# Patient Record
Sex: MALE | Race: OTHER | URBAN - METROPOLITAN AREA
[De-identification: names, ages, dates, MRNs, and addresses within clinical notes are randomized per-mention and may not be internally consistent; named-entity substitution may affect disease eponyms.]

---

## 2017-07-20 ENCOUNTER — EMERGENCY (EMERGENCY)
Facility: HOSPITAL | Age: 42
LOS: 0 days | Discharge: HOME | End: 2017-07-20
Attending: EMERGENCY MEDICINE | Admitting: EMERGENCY MEDICINE
Payer: SUBSIDIZED

## 2017-07-20 VITALS
RESPIRATION RATE: 18 BRPM | DIASTOLIC BLOOD PRESSURE: 102 MMHG | WEIGHT: 184.09 LBS | OXYGEN SATURATION: 98 % | SYSTOLIC BLOOD PRESSURE: 163 MMHG | TEMPERATURE: 99 F | HEART RATE: 103 BPM | HEIGHT: 68 IN

## 2017-07-20 VITALS
RESPIRATION RATE: 17 BRPM | SYSTOLIC BLOOD PRESSURE: 150 MMHG | TEMPERATURE: 99 F | HEART RATE: 82 BPM | OXYGEN SATURATION: 99 % | DIASTOLIC BLOOD PRESSURE: 94 MMHG

## 2017-07-20 PROCEDURE — 99285 EMERGENCY DEPT VISIT HI MDM: CPT

## 2017-07-20 PROCEDURE — 70486 CT MAXILLOFACIAL W/O DYE: CPT | Mod: 26

## 2017-07-20 PROCEDURE — 76377 3D RENDER W/INTRP POSTPROCES: CPT | Mod: 26

## 2017-07-20 RX ORDER — OXYCODONE HYDROCHLORIDE 5 MG/1
5 TABLET ORAL ONCE
Qty: 0 | Refills: 0 | Status: DISCONTINUED | OUTPATIENT
Start: 2017-07-20 | End: 2017-07-20

## 2017-07-20 RX ORDER — CEPHALEXIN 500 MG
1 CAPSULE ORAL
Qty: 10 | Refills: 0 | OUTPATIENT
Start: 2017-07-20 | End: 2017-07-25

## 2017-07-20 RX ADMIN — OXYCODONE HYDROCHLORIDE 5 MILLIGRAM(S): 5 TABLET ORAL at 12:28

## 2017-07-20 NOTE — ED ADULT NURSE NOTE - OBJECTIVE STATEMENT
Pt is a 41y male, A & O x 3, VSS, presents to ED s/p fall from 2 s-3 steps, pt was carrying boxes and tripped, hit head on step, witnessed, pt denies LOV, PERRL, pt has bleeding bilateral nostrils, controlled, pt in no apparent distress, will contnue to monitor. Denies blood thinners

## 2017-07-20 NOTE — ED STATDOCS - ATTENDING CONTRIBUTION TO CARE
I performed the initial face to face bedside interview with this patient regarding history of present illness, review of symptoms and past medical, social and family history.  I completed an independent physical examination.  I was the initial provider who evaluated this patient.  The history, review of symptoms and examination was documented by the NP in my presence and I attest to the accuracy of the documentation.  I have signed out the follow up of any pending tests (i.e. labs, radiological studies) to the NP.  I have discussed the patient’s plan of care and disposition with the NP.

## 2017-07-20 NOTE — ED STATDOCS - PROGRESS NOTE DETAILS
41 yr. old male PMH: presents to ED with pain and swelling of nose and right shoulder pain after fall while carrying a box this am approximately 1 hr. ago. No LOC . No weakness. No dizziness or hradache. Seen and examined by attending in Intake. Plan: IV, Labs and CT. Will F/U with results and re evaluate. Jered QUEZADA 41 yr. old male PMH: presents to ED with pain and swelling of nose and right shoulder pain after fall while carrying a box this am approximately 1 hr. ago. No LOC . No weakness. No dizziness or headache. Seen and examined by attending in Intake. Plan: IV, Labs and CT. Will F/U with results and re evaluate. MTangredi NP  PE: HEENT PERRLA EOMS intact, Face tender swollen deformity of nose with bloody drainage from right nostril, tender left TMJ , no ecchymosis or abrasions, Neck: non tender c-spine ROM: flexion and lateral rotation normal, Chest: non tender Lungs clear, Abd: non tender Hip and Pelvis non tender , Right Shoulder: mild tender ROM; normal, rad. pulse + No neurovascular deficit. MTangredi NP Results of CT Facial bones returned Dr. Barrow called for consult. Jered QUEZADA 41 yr. old male PMH: presents to ED with pain and swelling of nose and right shoulder pain after fall while carrying a box this am approximately 1 hr. ago. No LOC . No weakness. No dizziness or headache. Seen and examined by attending in Intake. Plan: IV, Labs and CT. Will F/U with results and re evaluate. MTangredi NP  PE: HEENT PERRLA EOMS intact, Face tender swollen deformity of nose with bloody drainage from right nostril,no submucosal hematoma, tender left TMJ , no ecchymosis or abrasions, Neck: non tender c-spine ROM: flexion and lateral rotation normal, Chest: non tender Lungs clear, Abd: non tender Hip and Pelvis non tender , Right Shoulder: mild tender ROM; normal, rad. pulse + No neurovascular deficit. MTangredi NP Dr. Barrow returened call reported results of CT and instructed to have patient F/U in office in 1 week. Jered QUEZADA

## 2017-07-20 NOTE — ED ADULT TRIAGE NOTE - CHIEF COMPLAINT QUOTE
trip and fall carrying boxes, struck nose, no loc no dizziness or headache trip and fall carrying boxes, struck nose, no lo, no blood thinners, no dizziness or headache

## 2017-07-20 NOTE — ED STATDOCS - MEDICAL DECISION MAKING DETAILS
Pt with nasal bone fx.  Will have f/u with ENT, ice area, no nose blowing, pain meds.  Return precautions given.

## 2017-07-20 NOTE — ED STATDOCS - SKIN TURGOR, MLM
swelling and tenderness to the bilateral nasal bones. sub crepitus no active bleeding from nose. no septal hematoma. no other facial trauma.

## 2017-07-20 NOTE — ED STATDOCS - PROGRESS NOTE ADDITIONAL1
At [facility], we strive to deliver an exceptional experience to you, every time we see you.    If you receive a survey in the mail, please send us back your thoughts. We really do value your feedback.    Thank you for visiting [facility]    Normal or non-critical lab and imaging results will be communicated to you by MyChart, letter or phone within 7 days.  If you do not hear from us within 10 days, please call the clinic. If you have a critical or abnormal lab result, we will notify you by phone as soon as possible.     If you have any questions regarding your visit please contact:     [facility]  Clinic Hours Telephone Number   Dr. Murtaza Maciel   7am-7pm  Monday through Thursday  7am-5pm Friday [phone]  Dona CARRILLO RN   Pharmacy 8:30am-9pm Monday-Friday    9am-5pm Saturday-Sunday [phone]   Urgent Care 11am-9pm Monday-Friday        9am-5pm Saturday-Sunday [phone]     After hours, weekend or if you need to make an appointment with your primary provider please call [phone].   After Hours nurse advise: call [place] [facility]: [phone]    Use [employer] (secure email communication and access to your chart) to send your primary care provider a message or make an appointment. Ask someone on your Team how to sign up for [employer]. To log on to [employer] or for more information in [employer] please visit the website at [email or web address].   As of October 8, 2013, all password changes, disabled accounts, or ID changes in [employer]MyHealth will be done by our Access Services Department.   If you need help with your account or password, call: [phone]. Clinic staff no longer has the ability to change passwords.     
Additional Progress Note...

## 2017-07-20 NOTE — ED STATDOCS - OBJECTIVE STATEMENT
42 y/o male presents to ED due to a fall one hour ago. Pt was carrying a box when he tripped and fell, landing on his face. C/o nose pain, right shoulder pain.

## 2017-07-20 NOTE — ED ADULT NURSE NOTE - CAS EDN DISCHARGE ASSESSMENT
No adverse reaction to first time med in ED/Symptoms improved/Alert and oriented to person, place and time

## 2017-07-21 DIAGNOSIS — S02.2XXA FRACTURE OF NASAL BONES, INITIAL ENCOUNTER FOR CLOSED FRACTURE: ICD-10-CM
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
## 2018-04-19 PROBLEM — Z00.00 ENCOUNTER FOR PREVENTIVE HEALTH EXAMINATION: Status: ACTIVE | Noted: 2018-04-19

## 2018-06-04 ENCOUNTER — APPOINTMENT (OUTPATIENT)
Dept: UROLOGY | Facility: HOSPITAL | Age: 43
End: 2018-06-04

## 2018-06-04 ENCOUNTER — OUTPATIENT (OUTPATIENT)
Dept: OUTPATIENT SERVICES | Facility: HOSPITAL | Age: 43
LOS: 1 days | End: 2018-06-04
Payer: SELF-PAY

## 2018-06-04 VITALS — DIASTOLIC BLOOD PRESSURE: 90 MMHG | SYSTOLIC BLOOD PRESSURE: 147 MMHG | HEART RATE: 79 BPM

## 2018-06-04 VITALS — BODY MASS INDEX: 28.93 KG/M2 | WEIGHT: 180 LBS | HEIGHT: 66 IN

## 2018-06-04 DIAGNOSIS — N40.1 BENIGN PROSTATIC HYPERPLASIA WITH LOWER URINARY TRACT SYMPMS: ICD-10-CM

## 2018-06-04 DIAGNOSIS — N13.8 BENIGN PROSTATIC HYPERPLASIA WITH LOWER URINARY TRACT SYMPMS: ICD-10-CM

## 2018-06-04 DIAGNOSIS — N40.1 BENIGN PROSTATIC HYPERPLASIA WITH LOWER URINARY TRACT SYMPTOMS: ICD-10-CM

## 2018-06-04 DIAGNOSIS — R30.0 DYSURIA: ICD-10-CM

## 2018-06-04 LAB
APPEARANCE UR: CLEAR — SIGNIFICANT CHANGE UP
BACTERIA # UR AUTO: NEGATIVE — SIGNIFICANT CHANGE UP
BILIRUB UR-MCNC: NEGATIVE — SIGNIFICANT CHANGE UP
COLOR SPEC: YELLOW — SIGNIFICANT CHANGE UP
DIFF PNL FLD: NEGATIVE — SIGNIFICANT CHANGE UP
EPI CELLS # UR: 0 /HPF — SIGNIFICANT CHANGE UP (ref 0–5)
GLUCOSE UR QL: NEGATIVE MG/DL — SIGNIFICANT CHANGE UP
KETONES UR-MCNC: NEGATIVE — SIGNIFICANT CHANGE UP
LEUKOCYTE ESTERASE UR-ACNC: NEGATIVE — SIGNIFICANT CHANGE UP
NITRITE UR-MCNC: NEGATIVE — SIGNIFICANT CHANGE UP
PH UR: 6 — SIGNIFICANT CHANGE UP (ref 5–8)
PROT UR-MCNC: NEGATIVE MG/DL — SIGNIFICANT CHANGE UP
PSA FREE FLD-MCNC: 0.27 NG/ML — SIGNIFICANT CHANGE UP
PSA FREE FLD-MCNC: 62.8 — SIGNIFICANT CHANGE UP
PSA FREE MFR FLD: 0.43 NG/ML — SIGNIFICANT CHANGE UP (ref 0–4)
RBC CASTS # UR COMP ASSIST: 1 /HPF — SIGNIFICANT CHANGE UP (ref 0–4)
SP GR SPEC: 1.02 — SIGNIFICANT CHANGE UP (ref 1.01–1.02)
UROBILINOGEN FLD QL: NEGATIVE MG/DL — SIGNIFICANT CHANGE UP
WBC UR QL: 1 /HPF — SIGNIFICANT CHANGE UP (ref 0–5)

## 2018-06-04 PROCEDURE — 51798 US URINE CAPACITY MEASURE: CPT

## 2018-06-04 PROCEDURE — 87086 URINE CULTURE/COLONY COUNT: CPT

## 2018-06-04 PROCEDURE — 81001 URINALYSIS AUTO W/SCOPE: CPT

## 2018-06-04 PROCEDURE — 84154 ASSAY OF PSA FREE: CPT

## 2018-06-04 PROCEDURE — 84153 ASSAY OF PSA TOTAL: CPT

## 2018-06-04 PROCEDURE — G0463: CPT

## 2018-06-04 RX ORDER — TAMSULOSIN HYDROCHLORIDE 0.4 MG/1
0.4 CAPSULE ORAL
Qty: 30 | Refills: 0 | Status: ACTIVE | COMMUNITY
Start: 2018-06-04 | End: 1900-01-01

## 2018-06-09 ENCOUNTER — APPOINTMENT (OUTPATIENT)
Dept: ULTRASOUND IMAGING | Facility: CLINIC | Age: 43
End: 2018-06-09

## 2018-07-02 ENCOUNTER — APPOINTMENT (OUTPATIENT)
Dept: UROLOGY | Facility: HOSPITAL | Age: 43
End: 2018-07-02

## 2023-11-24 ENCOUNTER — EMERGENCY (EMERGENCY)
Facility: HOSPITAL | Age: 48
LOS: 0 days | Discharge: HOME | End: 2023-11-24
Attending: STUDENT IN AN ORGANIZED HEALTH CARE EDUCATION/TRAINING PROGRAM
Payer: COMMERCIAL

## 2023-11-24 VITALS
HEART RATE: 85 BPM | SYSTOLIC BLOOD PRESSURE: 150 MMHG | RESPIRATION RATE: 18 BRPM | TEMPERATURE: 98 F | DIASTOLIC BLOOD PRESSURE: 96 MMHG | OXYGEN SATURATION: 96 %

## 2023-11-24 VITALS
OXYGEN SATURATION: 97 % | SYSTOLIC BLOOD PRESSURE: 163 MMHG | HEIGHT: 68 IN | TEMPERATURE: 98 F | RESPIRATION RATE: 15 BRPM | HEART RATE: 98 BPM | WEIGHT: 158.07 LBS | DIASTOLIC BLOOD PRESSURE: 96 MMHG
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
## 2023-11-24 DIAGNOSIS — R07.9 CHEST PAIN, UNSPECIFIED: ICD-10-CM

## 2023-11-24 DIAGNOSIS — R10.32 LEFT LOWER QUADRANT PAIN: ICD-10-CM

## 2023-11-24 DIAGNOSIS — M54.2 CERVICALGIA: ICD-10-CM
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
## 2023-11-24 DIAGNOSIS — M25.512 PAIN IN LEFT SHOULDER: ICD-10-CM
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
## 2023-11-24 LAB
ALBUMIN SERPL ELPH-MCNC: 3.9 G/DL — SIGNIFICANT CHANGE UP (ref 3.3–5)
ALBUMIN SERPL ELPH-MCNC: 3.9 G/DL — SIGNIFICANT CHANGE UP (ref 3.3–5)
ALP SERPL-CCNC: 175 U/L — HIGH (ref 40–120)
ALP SERPL-CCNC: 175 U/L — HIGH (ref 40–120)
ALT FLD-CCNC: 72 U/L — SIGNIFICANT CHANGE UP (ref 12–78)
ALT FLD-CCNC: 72 U/L — SIGNIFICANT CHANGE UP (ref 12–78)
ANION GAP SERPL CALC-SCNC: 5 MMOL/L — SIGNIFICANT CHANGE UP (ref 5–17)
ANION GAP SERPL CALC-SCNC: 5 MMOL/L — SIGNIFICANT CHANGE UP (ref 5–17)
APTT BLD: 28.1 SEC — SIGNIFICANT CHANGE UP (ref 24.5–35.6)
APTT BLD: 28.1 SEC — SIGNIFICANT CHANGE UP (ref 24.5–35.6)
AST SERPL-CCNC: 33 U/L — SIGNIFICANT CHANGE UP (ref 15–37)
AST SERPL-CCNC: 33 U/L — SIGNIFICANT CHANGE UP (ref 15–37)
BASOPHILS # BLD AUTO: 0.04 K/UL — SIGNIFICANT CHANGE UP (ref 0–0.2)
BASOPHILS # BLD AUTO: 0.04 K/UL — SIGNIFICANT CHANGE UP (ref 0–0.2)
BASOPHILS NFR BLD AUTO: 0.6 % — SIGNIFICANT CHANGE UP (ref 0–2)
BASOPHILS NFR BLD AUTO: 0.6 % — SIGNIFICANT CHANGE UP (ref 0–2)
BILIRUB SERPL-MCNC: 0.7 MG/DL — SIGNIFICANT CHANGE UP (ref 0.2–1.2)
BILIRUB SERPL-MCNC: 0.7 MG/DL — SIGNIFICANT CHANGE UP (ref 0.2–1.2)
BUN SERPL-MCNC: 10 MG/DL — SIGNIFICANT CHANGE UP (ref 7–23)
BUN SERPL-MCNC: 10 MG/DL — SIGNIFICANT CHANGE UP (ref 7–23)
CALCIUM SERPL-MCNC: 9.2 MG/DL — SIGNIFICANT CHANGE UP (ref 8.5–10.1)
CALCIUM SERPL-MCNC: 9.2 MG/DL — SIGNIFICANT CHANGE UP (ref 8.5–10.1)
CHLORIDE SERPL-SCNC: 101 MMOL/L — SIGNIFICANT CHANGE UP (ref 96–108)
CHLORIDE SERPL-SCNC: 101 MMOL/L — SIGNIFICANT CHANGE UP (ref 96–108)
CO2 SERPL-SCNC: 29 MMOL/L — SIGNIFICANT CHANGE UP (ref 22–31)
CO2 SERPL-SCNC: 29 MMOL/L — SIGNIFICANT CHANGE UP (ref 22–31)
CREAT SERPL-MCNC: 1.13 MG/DL — SIGNIFICANT CHANGE UP (ref 0.5–1.3)
CREAT SERPL-MCNC: 1.13 MG/DL — SIGNIFICANT CHANGE UP (ref 0.5–1.3)
EGFR: 81 ML/MIN/1.73M2 — SIGNIFICANT CHANGE UP
EGFR: 81 ML/MIN/1.73M2 — SIGNIFICANT CHANGE UP
EOSINOPHIL # BLD AUTO: 0.05 K/UL — SIGNIFICANT CHANGE UP (ref 0–0.5)
EOSINOPHIL # BLD AUTO: 0.05 K/UL — SIGNIFICANT CHANGE UP (ref 0–0.5)
EOSINOPHIL NFR BLD AUTO: 0.7 % — SIGNIFICANT CHANGE UP (ref 0–6)
EOSINOPHIL NFR BLD AUTO: 0.7 % — SIGNIFICANT CHANGE UP (ref 0–6)
GLUCOSE SERPL-MCNC: 305 MG/DL — HIGH (ref 70–99)
GLUCOSE SERPL-MCNC: 305 MG/DL — HIGH (ref 70–99)
HCT VFR BLD CALC: 44.9 % — SIGNIFICANT CHANGE UP (ref 39–50)
HCT VFR BLD CALC: 44.9 % — SIGNIFICANT CHANGE UP (ref 39–50)
HGB BLD-MCNC: 16.6 G/DL — SIGNIFICANT CHANGE UP (ref 13–17)
HGB BLD-MCNC: 16.6 G/DL — SIGNIFICANT CHANGE UP (ref 13–17)
IMM GRANULOCYTES NFR BLD AUTO: 0.8 % — SIGNIFICANT CHANGE UP (ref 0–0.9)
IMM GRANULOCYTES NFR BLD AUTO: 0.8 % — SIGNIFICANT CHANGE UP (ref 0–0.9)
INR BLD: 0.9 RATIO — SIGNIFICANT CHANGE UP (ref 0.85–1.18)
INR BLD: 0.9 RATIO — SIGNIFICANT CHANGE UP (ref 0.85–1.18)
LIDOCAIN IGE QN: 25 U/L — SIGNIFICANT CHANGE UP (ref 13–75)
LIDOCAIN IGE QN: 25 U/L — SIGNIFICANT CHANGE UP (ref 13–75)
LYMPHOCYTES # BLD AUTO: 1.18 K/UL — SIGNIFICANT CHANGE UP (ref 1–3.3)
LYMPHOCYTES # BLD AUTO: 1.18 K/UL — SIGNIFICANT CHANGE UP (ref 1–3.3)
LYMPHOCYTES # BLD AUTO: 16.5 % — SIGNIFICANT CHANGE UP (ref 13–44)
LYMPHOCYTES # BLD AUTO: 16.5 % — SIGNIFICANT CHANGE UP (ref 13–44)
MCHC RBC-ENTMCNC: 31.8 PG — SIGNIFICANT CHANGE UP (ref 27–34)
MCHC RBC-ENTMCNC: 31.8 PG — SIGNIFICANT CHANGE UP (ref 27–34)
MCHC RBC-ENTMCNC: 37 GM/DL — HIGH (ref 32–36)
MCHC RBC-ENTMCNC: 37 GM/DL — HIGH (ref 32–36)
MCV RBC AUTO: 86 FL — SIGNIFICANT CHANGE UP (ref 80–100)
MCV RBC AUTO: 86 FL — SIGNIFICANT CHANGE UP (ref 80–100)
MONOCYTES # BLD AUTO: 0.42 K/UL — SIGNIFICANT CHANGE UP (ref 0–0.9)
MONOCYTES # BLD AUTO: 0.42 K/UL — SIGNIFICANT CHANGE UP (ref 0–0.9)
MONOCYTES NFR BLD AUTO: 5.9 % — SIGNIFICANT CHANGE UP (ref 2–14)
MONOCYTES NFR BLD AUTO: 5.9 % — SIGNIFICANT CHANGE UP (ref 2–14)
NEUTROPHILS # BLD AUTO: 5.4 K/UL — SIGNIFICANT CHANGE UP (ref 1.8–7.4)
NEUTROPHILS # BLD AUTO: 5.4 K/UL — SIGNIFICANT CHANGE UP (ref 1.8–7.4)
NEUTROPHILS NFR BLD AUTO: 75.5 % — SIGNIFICANT CHANGE UP (ref 43–77)
NEUTROPHILS NFR BLD AUTO: 75.5 % — SIGNIFICANT CHANGE UP (ref 43–77)
PLATELET # BLD AUTO: 219 K/UL — SIGNIFICANT CHANGE UP (ref 150–400)
PLATELET # BLD AUTO: 219 K/UL — SIGNIFICANT CHANGE UP (ref 150–400)
POTASSIUM SERPL-MCNC: 4 MMOL/L — SIGNIFICANT CHANGE UP (ref 3.5–5.3)
POTASSIUM SERPL-MCNC: 4 MMOL/L — SIGNIFICANT CHANGE UP (ref 3.5–5.3)
POTASSIUM SERPL-SCNC: 4 MMOL/L — SIGNIFICANT CHANGE UP (ref 3.5–5.3)
POTASSIUM SERPL-SCNC: 4 MMOL/L — SIGNIFICANT CHANGE UP (ref 3.5–5.3)
PROT SERPL-MCNC: 8.4 GM/DL — HIGH (ref 6–8.3)
PROT SERPL-MCNC: 8.4 GM/DL — HIGH (ref 6–8.3)
PROTHROM AB SERPL-ACNC: 10.2 SEC — SIGNIFICANT CHANGE UP (ref 9.5–13)
PROTHROM AB SERPL-ACNC: 10.2 SEC — SIGNIFICANT CHANGE UP (ref 9.5–13)
RBC # BLD: 5.22 M/UL — SIGNIFICANT CHANGE UP (ref 4.2–5.8)
RBC # BLD: 5.22 M/UL — SIGNIFICANT CHANGE UP (ref 4.2–5.8)
RBC # FLD: 11.9 % — SIGNIFICANT CHANGE UP (ref 10.3–14.5)
RBC # FLD: 11.9 % — SIGNIFICANT CHANGE UP (ref 10.3–14.5)
SODIUM SERPL-SCNC: 135 MMOL/L — SIGNIFICANT CHANGE UP (ref 135–145)
SODIUM SERPL-SCNC: 135 MMOL/L — SIGNIFICANT CHANGE UP (ref 135–145)
WBC # BLD: 7.15 K/UL — SIGNIFICANT CHANGE UP (ref 3.8–10.5)
WBC # BLD: 7.15 K/UL — SIGNIFICANT CHANGE UP (ref 3.8–10.5)
WBC # FLD AUTO: 7.15 K/UL — SIGNIFICANT CHANGE UP (ref 3.8–10.5)
WBC # FLD AUTO: 7.15 K/UL — SIGNIFICANT CHANGE UP (ref 3.8–10.5)

## 2023-11-24 PROCEDURE — 93010 ELECTROCARDIOGRAM REPORT: CPT

## 2023-11-24 PROCEDURE — 85610 PROTHROMBIN TIME: CPT

## 2023-11-24 PROCEDURE — 99285 EMERGENCY DEPT VISIT HI MDM: CPT | Mod: 25

## 2023-11-24 PROCEDURE — 99285 EMERGENCY DEPT VISIT HI MDM: CPT

## 2023-11-24 PROCEDURE — 93005 ELECTROCARDIOGRAM TRACING: CPT

## 2023-11-24 PROCEDURE — 74177 CT ABD & PELVIS W/CONTRAST: CPT | Mod: MA

## 2023-11-24 PROCEDURE — 74177 CT ABD & PELVIS W/CONTRAST: CPT | Mod: 26,MA

## 2023-11-24 PROCEDURE — 70450 CT HEAD/BRAIN W/O DYE: CPT | Mod: 26,MA

## 2023-11-24 PROCEDURE — 72125 CT NECK SPINE W/O DYE: CPT | Mod: MA

## 2023-11-24 PROCEDURE — 85025 COMPLETE CBC W/AUTO DIFF WBC: CPT

## 2023-11-24 PROCEDURE — 36415 COLL VENOUS BLD VENIPUNCTURE: CPT

## 2023-11-24 PROCEDURE — 80053 COMPREHEN METABOLIC PANEL: CPT

## 2023-11-24 PROCEDURE — 71260 CT THORAX DX C+: CPT | Mod: MA

## 2023-11-24 PROCEDURE — 96374 THER/PROPH/DIAG INJ IV PUSH: CPT | Mod: XU

## 2023-11-24 PROCEDURE — 85730 THROMBOPLASTIN TIME PARTIAL: CPT

## 2023-11-24 PROCEDURE — 72125 CT NECK SPINE W/O DYE: CPT | Mod: 26,MA

## 2023-11-24 PROCEDURE — 71260 CT THORAX DX C+: CPT | Mod: 26,MA

## 2023-11-24 PROCEDURE — 70450 CT HEAD/BRAIN W/O DYE: CPT | Mod: MA

## 2023-11-24 PROCEDURE — 83690 ASSAY OF LIPASE: CPT

## 2023-11-24 RX ORDER — ACETAMINOPHEN 500 MG
1000 TABLET ORAL ONCE
Refills: 0 | Status: COMPLETED | OUTPATIENT
Start: 2023-11-24 | End: 2023-11-24

## 2023-11-24 RX ADMIN — Medication 400 MILLIGRAM(S): at 17:03

## 2023-11-24 NOTE — ED STATDOCS - NSFOLLOWUPINSTRUCTIONS_ED_ALL_ED_FT
Please follow-up with your doctor(s) within the next 3 days, but see medical sooner if your symptoms persist or worsen.  Please call tomorrow for an appointment.    You were given a copy of your labs and/or imaging.  Please go-over these with your doctor(s).     If you have any worsening of symptoms or any other concerns please see your doctor or return to the ED immediately.    Please continue taking your home medications as directed.  Do not use alcohol when taking any medication (especially antibiotics, tylenol or other pain medication) unless you check with the doctor or pharmacist.     Please use 650mg tylenol (or acetaminophen) every 6 hours and 600mg motrin (or advil or ibuprofen) every 6 hours as needed for pain/discomfort/swelling.  Make sure not to use more than 3500mg in any 24 hour period.       Accidente automovilístico    LO QUE NECESITA SABER:    Los accidentes automovilísticos pueden causar lesiones ocasionadas por el impacto o por april sido movido de un lado al otro dentro del greg. Podría tener un hematoma en el abdomen, pecho o helen debido al cinturón de seguridad. También puede que tenga dolor en beltran gaviota, helen o espalda. Podría sentir dolor en las rodillas, caderas o muslos si beltran cuerpo golpea el tablero o el volante. El dolor muscular tiende a empeorar de 1 a 2 días después del accidente.    INSTRUCCIONES SOBRE EL DINA HOSPITALARIA:    Llame al número de emergencias local (911 en [place]) si:    Usted tiene un nuevo dolor de pecho o éste empeora, o tiene falta de aliento.    Llame a beltran médico si:    Usted tiene un dolor nuevo o peor en el abdomen.    Usted tiene náuseas y vómitos que no mejoran.    Usted tiene un natalie dolor de gautam.    Usted tiene debilidad, hormigueo o adormecimiento en chapin brazos o piernas.    Usted tiene un dolor nuevo o peor que le dificulta el movimiento.    Usted tiene dolor que aparece de 2 a 3 días después del accidente.    Usted tiene preguntas o inquietudes acerca de beltran condición o cuidado.  Medicamentos:    Los analgésicospodría ser necesario. No espere hasta que el dolor sea severo antes de morena haily medicamento. Es posible que el medicamento no funcione tom debería para controlar beltran dolor si espera demasiado tiempo para tomarlo. Los analgésicos pueden causarle mareos o somnolencia. Evite las caídas pidiendo ayuda cuando desee levantarse de la cama.    AINEcomo el ibuprofeno, ayudan a disminuir la inflamación, el dolor y la fiebre. Haily medicamento está disponible con o sin valentine receta médica. Los CHINO pueden causar sangrado estomacal o problemas renales en ciertas personas. Si usted está tomando un anticoagulante, siempre pregunte si los CHINO son seguros para usted. Siempre erika la etiqueta de haily medicamento y siga las instrucciones. No administre haily medicamento a niños menores de 6 meses de josef sin antes obtener la autorización del médico.    [place] medicamentos tom se le haya indicado.Consulte con beltran médico si usted anastasia que beltran medicamento no le está ayudando o si presenta efectos secundarios. Infórmele al médico si usted es alérgico a algún medicamento. Mantenga valentine lista actualizada de los medicamentos, las vitaminas y los productos herbales que debi. Incluya los siguientes datos de los medicamentos: cantidad, frecuencia y motivo de administración. Traiga con usted la lista o los envases de las píldoras a chapin citas de seguimiento. Lleve la lista de los medicamentos con usted en kelvin de valentine emergencia.  Cuidados personales:    Use hielo y calor.El hielo ayuda a disminuir la inflamación y el dolor. El hielo también puede contribuir a evitar el daño de los tejidos. Use valentine compresa de hielo o ponga hielo triturado en valentine bolsa de plástico. Cúbrala con valentine toalla y aplíquela al área adolorida por 15 a 20 minutos cada hora o tom se le indique. Después de 2 días, use valentine compresa caliente en el área lesionada. Aplique calor tom se lo recomiende el médico.    Estire chapin músculos cuidadosamente.Kaylie ejercicios suaves para estirar chapin músculos después de april sufrido un accidente automovilístico. Consulte con beltran médico sobre cuáles ejercicios hacer.  Consejos de seguridad:Lo siguiente puede ayudar a prevenir otro accidente automovilístico o a reducir el riesgo de lesiones:    Use siempre beltran cinturón de seguridad.El uso de beltran cinturón de seguridad ayudará a reducir las lesiones sufridas por accidentes automovilísticos. El cinturón de seguridad debe tener valentine amaya que atraviese beltran pecho y otra que atraviese beltran regazo.    Siempre coloque a beltran hijo en un asiento de seguridad para niños.Use un asiento de seguridad hecho para beltran edad, altura y peso. Elija un asiento de seguridad que tenga un arnés y un broche. Coloque el asiento de seguridad en la plaza del medio del asiento trasero del automóvil. El asiento de seguridad no debería moverse en ninguna dirección más de 1 pulgada después de ajustarlo. Siga siempre las instrucciones proporcionadas para beltran asiento de seguridad para ayudarle a colocarlo. Las instrucciones también le indicarán cómo sujetar a beltran emily en el asiento correctamente. Pregúntele a beltran médico sobre más información acerca de los asientos de seguridad para niños.  Asiento de seguridad para niños en automóviles      Disminuya la velocidad.Maneje beltran greg al límite de velocidad para reducir beltran riesgo de accidentes automovilísticos.    No maneje si se siente cansado.Usted reacciona más lentamente cuando está cansado. El tiempo de reacción lento aumentará el riesgo de un accidente automovilístico.    No hable por teléfono ni envíe mensajes de texto mientras maneje.Usted no reaccionará lo suficientemente rápido en valentine emergencia si se distrae con mensajes de texto o conversaciones.    No consuma drogas ni alcohol antes de manejar.Es probable que se sienta más cansado o tome riesgos que usualmente no tomaría. No maneje después de morena medicamentos que le dan sueño. Use un conductor designado o kaylie arreglos para que lo lleven a beltran casa.    Ayude a beltran hijo adolescente a convertirse en un conductor seguro.Sea un buen modelo al manejar. Hable con beltran hijo adolescente sobre las maneras de reducir el riesgo de un accidente automovilístico. Estas incluyen no conducir cuando está cansado y no tener distracciones, tom un teléfono. Recuérdele a beltran hijo adolescente que siempre debe ir al límite de velocidad y usar el cinturón de seguridad.  Acuda a la consulta de control con beltran médico según las indicaciones:Anote chapin preguntas para que se acuerde de hacerlas asif chapin visitas.    © [employer] 1973, 2023 Please follow-up with your doctor(s) within the next 3 days, but see medical sooner if your symptoms persist or worsen.  Please call tomorrow for an appointment.    You were given a copy of your labs and/or imaging.  Please go-over these with your doctor(s).     If you have any worsening of symptoms or any other concerns please see your doctor or return to the ED immediately.    Please continue taking your home medications as directed.  Do not use alcohol when taking any medication (especially antibiotics, tylenol or other pain medication) unless you check with the doctor or pharmacist.     Please use 650mg tylenol (or acetaminophen) every 6 hours and 600mg motrin (or advil or ibuprofen) every 6 hours as needed for pain/discomfort/swelling.  Make sure not to use more than 3500mg in any 24 hour period.       Accidente automovilístico    LO QUE NECESITA SABER:    Los accidentes automovilísticos pueden causar lesiones ocasionadas por el impacto o por april sido movido de un lado al otro dentro del greg. Podría tener un hematoma en el abdomen, pecho o helen debido al cinturón de seguridad. También puede que tenga dolor en beltran gaviota, helen o espalda. Podría sentir dolor en las rodillas, caderas o muslos si beltran cuerpo golpea el tablero o el volante. El dolor muscular tiende a empeorar de 1 a 2 días después del accidente.    INSTRUCCIONES SOBRE EL DINA HOSPITALARIA:    Llame al número de emergencias local (911 en [place]) si:    Usted tiene un nuevo dolor de pecho o éste empeora, o tiene falta de aliento.    Llame a beltran médico si:    Usted tiene un dolor nuevo o peor en el abdomen.    Usted tiene náuseas y vómitos que no mejoran.    Usted tiene un natalie dolor de gautam.    Usted tiene debilidad, hormigueo o adormecimiento en chapin brazos o piernas.    Usted tiene un dolor nuevo o peor que le dificulta el movimiento.    Usted tiene dolor que aparece de 2 a 3 días después del accidente.    Usted tiene preguntas o inquietudes acerca de beltran condición o cuidado.  Medicamentos:    Los analgésicospodría ser necesario. No espere hasta que el dolor sea severo antes de morena haily medicamento. Es posible que el medicamento no funcione tom debería para controlar beltran dolor si espera demasiado tiempo para tomarlo. Los analgésicos pueden causarle mareos o somnolencia. Evite las caídas pidiendo ayuda cuando desee levantarse de la cama.    AINEcomo el ibuprofeno, ayudan a disminuir la inflamación, el dolor y la fiebre. Haily medicamento está disponible con o sin valentine receta médica. Los CHINO pueden causar sangrado estomacal o problemas renales en ciertas personas. Si usted está tomando un anticoagulante, siempre pregunte si los CHINO son seguros para usted. Siempre erika la etiqueta de haily medicamento y siga las instrucciones. No administre haily medicamento a niños menores de 6 meses de josef sin antes obtener la autorización del médico.    [place] chapin medicamentos tom se le haya indicado.Consulte con beltran médico si usted anastasia que beltran medicamento no le está ayudando o si presenta efectos secundarios. Infórmele al médico si usted es alérgico a algún medicamento. Mantenga valentine lista actualizada de los medicamentos, las vitaminas y los productos herbales que debi. Incluya los siguientes datos de los medicamentos: cantidad, frecuencia y motivo de administración. Traiga con usted la lista o los envases de las píldoras a chapin citas de seguimiento. Lleve la lista de los medicamentos con usted en kelvin de valentine emergencia.  Cuidados personales:    Use hielo y calor.El hielo ayuda a disminuir la inflamación y el dolor. El hielo también puede contribuir a evitar el daño de los tejidos. Use valentine compresa de hielo o ponga hielo triturado en valentine bolsa de plástico. Cúbrala con valentine toalla y aplíquela al área adolorida por 15 a 20 minutos cada hora o tom se le indique. Después de 2 días, use valentine compresa caliente en el área lesionada. Aplique calor tom se lo recomiende el médico.    Estire chapin músculos cuidadosamente.Kaylie ejercicios suaves para estirar chapin músculos después de april sufrido un accidente automovilístico. Consulte con beltran médico sobre cuáles ejercicios hacer.  Consejos de seguridad:Lo siguiente puede ayudar a prevenir otro accidente automovilístico o a reducir el riesgo de lesiones:    Use siempre beltran cinturón de seguridad.El uso de beltran cinturón de seguridad ayudará a reducir las lesiones sufridas por accidentes automovilísticos. El cinturón de seguridad debe tener valentine amaya que atraviese beltran pecho y otra que atraviese beltran regazo.    Siempre coloque a beltran hijo en un asiento de seguridad para niños.Use un asiento de seguridad hecho para beltran edad, altura y peso. Elija un asiento de seguridad que tenga un arnés y un broche. Coloque el asiento de seguridad en la plaza del medio del asiento trasero del automóvil. El asiento de seguridad no debería moverse en ninguna dirección más de 1 pulgada después de ajustarlo. Siga siempre las instrucciones proporcionadas para beltran asiento de seguridad para ayudarle a colocarlo. Las instrucciones también le indicarán cómo sujetar a beltran emily en el asiento correctamente. Pregúntele a beltran médico sobre más información acerca de los asientos de seguridad para niños.  Asiento de seguridad para niños en automóviles      Disminuya la velocidad.Maneje beltran greg al límite de velocidad para reducir beltran riesgo de accidentes automovilísticos.    No maneje si se siente cansado.Usted reacciona más lentamente cuando está cansado. El tiempo de reacción lento aumentará el riesgo de un accidente automovilístico.    No hable por teléfono ni envíe mensajes de texto mientras maneje.Usted no reaccionará lo suficientemente rápido en valentine emergencia si se distrae con mensajes de texto o conversaciones.    No consuma drogas ni alcohol antes de manejar.Es probable que se sienta más cansado o tome riesgos que usualmente no tomaría. No maneje después de morena medicamentos que le dan sueño. Use un conductor designado o kaylie arreglos para que lo lleven a beltran casa.    Ayude a beltran hijo adolescente a convertirse en un conductor seguro.Sea un buen modelo al manejar. Hable con beltran hijo adolescente sobre las maneras de reducir el riesgo de un accidente automovilístico. Estas incluyen no conducir cuando está cansado y no tener distracciones, tom un teléfono. Recuérdele a beltran hijo adolescente que siempre debe ir al límite de velocidad y usar el cinturón de seguridad.  Acuda a la consulta de control con beltran médico según las indicaciones:Anote chapin preguntas para que se acuerde de hacerlas asif chapin visitas.    © [employer] 1973, 2023 Please follow-up with your doctor(s) within the next 3 days, but see medical sooner if your symptoms persist or worsen.  Please call tomorrow for an appointment.    You were given a copy of your labs and/or imaging.  Please go-over these with your doctor(s).     If you have any worsening of symptoms or any other concerns please see your doctor or return to the ED immediately.    Please continue taking your home medications as directed.  Do not use alcohol when taking any medication (especially antibiotics, tylenol or other pain medication) unless you check with the doctor or pharmacist.     Please use 650mg tylenol (or acetaminophen) every 6 hours and 600mg motrin (or advil or ibuprofen) every 6 hours as needed for pain/discomfort/swelling.  Make sure not to use more than 3500mg in any 24 hour period.       Accidente automovilístico    LO QUE NECESITA SABER:    Los accidentes automovilísticos pueden causar lesiones ocasionadas por el impacto o por april sido movido de un lado al otro dentro del greg. Podría tener un hematoma en el abdomen, pecho o helen debido al cinturón de seguridad. También puede que tenga dolor en beltran gaviota, helen o espalda. Podría sentir dolor en las rodillas, caderas o muslos si beltran cuerpo golpea el tablero o el volante. El dolor muscular tiende a empeorar de 1 a 2 días después del accidente.    INSTRUCCIONES SOBRE EL DINA HOSPITALARIA:    Llame al número de emergencias local (911 en [place]) si:    Usted tiene un nuevo dolor de pecho o éste empeora, o tiene falta de aliento.    Llame a beltran médico si:    Usted tiene un dolor nuevo o peor en el abdomen.    Usted tiene náuseas y vómitos que no mejoran.    Usted tiene un natalie dolor de gautam.    Usted tiene debilidad, hormigueo o adormecimiento en chapin brazos o piernas.    Usted tiene un dolor nuevo o peor que le dificulta el movimiento.    Usted tiene dolor que aparece de 2 a 3 días después del accidente.    Usted tiene preguntas o inquietudes acerca de beltran condición o cuidado.  Medicamentos:    Los analgésicospodría ser necesario. No espere hasta que el dolor sea severo antes de morena haily medicamento. Es posible que el medicamento no funcione tom debería para controlar beltran dolor si espera demasiado tiempo para tomarlo. Los analgésicos pueden causarle mareos o somnolencia. Evite las caídas pidiendo ayuda cuando desee levantarse de la cama.    AINEcomo el ibuprofeno, ayudan a disminuir la inflamación, el dolor y la fiebre. Haily medicamento está disponible con o sin valentine receta médica. Los CHINO pueden causar sangrado estomacal o problemas renales en ciertas personas. Si usted está tomando un anticoagulante, siempre pregunte si los CHINO son seguros para usted. Siempre erika la etiqueta de haily medicamento y siga las instrucciones. No administre haily medicamento a niños menores de 6 meses de josef sin antes obtener la autorización del médico.    [place] chapin medicamentos tom se le haya indicado.Consulte con beltran médico si usted anastasia que beltran medicamento no le está ayudando o si presenta efectos secundarios. Infórmele al médico si usted es alérgico a algún medicamento. Mantenga valentine lista actualizada de los medicamentos, las vitaminas y los productos herbales que debi. Incluya los siguientes datos de los medicamentos: cantidad, frecuencia y motivo de administración. Traiga con usted la lista o los envases de las píldoras a chapin citas de seguimiento. Lleve la lista de los medicamentos con usted en kelvin de valentine emergencia.  Cuidados personales:    Use hielo y calor.El hielo ayuda a disminuir la inflamación y el dolor. El hielo también puede contribuir a evitar el daño de los tejidos. Use valentine compresa de hielo o ponga hielo triturado en valentine bolsa de plástico. Cúbrala con valentine toalla y aplíquela al área adolorida por 15 a 20 minutos cada hora o tom se le indique. Después de 2 días, use valentine compresa caliente en el área lesionada. Aplique calor tom se lo recomiende el médico.    Estire chapin músculos cuidadosamente.Kaylie ejercicios suaves para estirar chapin músculos después de april sufrido un accidente automovilístico. Consulte con beltran médico sobre cuáles ejercicios hacer.  Consejos de seguridad:Lo siguiente puede ayudar a prevenir otro accidente automovilístico o a reducir el riesgo de lesiones:    Use siempre beltran cinturón de seguridad.El uso de beltran cinturón de seguridad ayudará a reducir las lesiones sufridas por accidentes automovilísticos. El cinturón de seguridad debe tener valentine amaya que atraviese beltran pecho y otra que atraviese beltran regazo.    Siempre coloque a beltran hijo en un asiento de seguridad para niños.Use un asiento de seguridad hecho para beltran edad, altura y peso. Elija un asiento de seguridad que tenga un arnés y un broche. Coloque el asiento de seguridad en la plaza del medio del asiento trasero del automóvil. El asiento de seguridad no debería moverse en ninguna dirección más de 1 pulgada después de ajustarlo. Siga siempre las instrucciones proporcionadas para beltran asiento de seguridad para ayudarle a colocarlo. Las instrucciones también le indicarán cómo sujetar a beltran emily en el asiento correctamente. Pregúntele a beltran médico sobre más información acerca de los asientos de seguridad para niños.  Asiento de seguridad para niños en automóviles      Disminuya la velocidad.Maneje beltran greg al límite de velocidad para reducir beltran riesgo de accidentes automovilísticos.    No maneje si se siente cansado.Usted reacciona más lentamente cuando está cansado. El tiempo de reacción lento aumentará el riesgo de un accidente automovilístico.    No hable por teléfono ni envíe mensajes de texto mientras maneje.Usted no reaccionará lo suficientemente rápido en valentine emergencia si se distrae con mensajes de texto o conversaciones.    No consuma drogas ni alcohol antes de manejar.Es probable que se sienta más cansado o tome riesgos que usualmente no tomaría. No maneje después de morena medicamentos que le dan sueño. Use un conductor designado o kaylie arreglos para que lo lleven a beltran casa.    Ayude a beltran hijo adolescente a convertirse en un conductor seguro.Sea un buen modelo al manejar. Hable con beltran hijo adolescente sobre las maneras de reducir el riesgo de un accidente automovilístico. Estas incluyen no conducir cuando está cansado y no tener distracciones, tom un teléfono. Recuérdele a beltran hijo adolescente que siempre debe ir al límite de velocidad y usar el cinturón de seguridad.  Acuda a la consulta de control con beltran médico según las indicaciones:Anote chapin preguntas para que se acuerde de hacerlas asif chapin visitas.    © [employer] 1973, 2023
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
## 2023-11-24 NOTE — ED ADULT NURSE NOTE - NSSEPSISSUSPECTED_ED_A_ED
Spoke with patient and reminded him of Lexiscan stress test appointment on September 25, 2019 at 0700 and he verbalized understanding. No
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
## 2023-11-24 NOTE — ED STATDOCS - PATIENT PORTAL LINK FT
You can access the FollowMyHealth Patient Portal offered by [facility] by registering at the following website: [email or web address]. By joining [employer]’s FollowMyHealth portal, you will also be able to view your health information using other applications (apps) compatible with our system. You can access the FollowMyHealth Patient Portal offered by [facility] by registering at the following website: [email or web address]. By joining [employer]’s FollowMyHealth portal, you will also be able to view your health information using other applications (apps) compatible with our system. You can access the FollowMyHealth Patient Portal offered by [facility] by registering at the following website: [email or web address]. By joining [employer]’s FollowMyHealth portal, you will also be able to view your health information using other applications (apps) compatible with our system.
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
## 2023-11-24 NOTE — ED STATDOCS - ATTENDING APP SHARED VISIT CONTRIBUTION OF CARE
I, Lg Verdin MD,  performed the initial face to face bedside interview with this patient regarding history of present illness, review of symptoms and relevant past medical, social and family history.  I completed an independent physical examination.  I was the initial provider who evaluated this patient.   I personally saw the patient and performed a substantive portion of the visit including all aspects of the medical decision making.  I have signed out the follow up of any pending tests (i.e. labs, radiological studies) to the RAVIN.  I have communicated the patient’s plan of care and disposition with the RAVIN.  The history, relevant review of systems, past medical and surgical history, medical decision making, and physical examination was documented by the scribe in my presence and I attest to the accuracy of the documentation.

## 2023-11-24 NOTE — ED ADULT NURSE NOTE - NS ED NURSE DISCH DISPOSITION
Discharged Topical Sulfur Applications Pregnancy And Lactation Text: This medication is Pregnancy Category C and has an unknown safety profile during pregnancy. It is unknown if this topical medication is excreted in breast milk.

## 2023-11-24 NOTE — ED STATDOCS - NS ED ATTENDING STATEMENT MOD
This was a shared visit with the RAVIN. I reviewed and verified the documentation and independently performed the documented:
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
## 2023-11-24 NOTE — ED STATDOCS - PHYSICAL EXAMINATION
Const: Well appearing, NAD  Eyes: PERRL, EOM intact  CV: RRR, no murmurs, no chest wall tenderness, distal pulses intact  Resp: CTAB, normal resp effort  GI: LLQ tenderness   MSK: midline c spine tenderness chest wall tenderness   Neuro: AOx3, GCS 15, No focal deficits  Skin: No rash, laceration or abrasion  Psych: calm, cooperative, No SI, HI, hallucination.

## 2024-04-30 NOTE — ED STATDOCS - ENMT NEGATIVE STATEMENT, MLM
Yes
Ears: no ear pain and no hearing problems.Nose: no nasal congestion and no nasal drainage.Mouth/Throat: no dysphagia, no hoarseness and no throat pain.Neck: no lumps, no pain, no stiffness and no swollen glands.

## 2025-05-06 ENCOUNTER — APPOINTMENT (OUTPATIENT)
Dept: UROLOGY | Facility: CLINIC | Age: 50
End: 2025-05-06

## 2025-05-06 VITALS
HEART RATE: 87 BPM | SYSTOLIC BLOOD PRESSURE: 151 MMHG | HEIGHT: 67 IN | RESPIRATION RATE: 16 BRPM | WEIGHT: 175 LBS | OXYGEN SATURATION: 97 % | DIASTOLIC BLOOD PRESSURE: 95 MMHG | BODY MASS INDEX: 27.47 KG/M2

## 2025-05-06 DIAGNOSIS — N13.8 BENIGN PROSTATIC HYPERPLASIA WITH LOWER URINARY TRACT SYMPMS: ICD-10-CM

## 2025-05-06 DIAGNOSIS — N40.1 BENIGN PROSTATIC HYPERPLASIA WITH LOWER URINARY TRACT SYMPMS: ICD-10-CM

## 2025-05-06 DIAGNOSIS — N40.0 BENIGN PROSTATIC HYPERPLASIA WITHOUT LOWER URINARY TRACT SYMPMS: ICD-10-CM

## 2025-05-06 DIAGNOSIS — R10.2 PELVIC AND PERINEAL PAIN: ICD-10-CM

## 2025-05-06 PROCEDURE — 99204 OFFICE O/P NEW MOD 45 MIN: CPT

## 2025-05-06 RX ORDER — SULFAMETHOXAZOLE AND TRIMETHOPRIM 800; 160 MG/1; MG/1
800-160 TABLET ORAL TWICE DAILY
Qty: 20 | Refills: 0 | Status: ACTIVE | COMMUNITY
Start: 2025-05-06 | End: 1900-01-01

## 2025-05-06 RX ORDER — TAMSULOSIN HYDROCHLORIDE 0.4 MG/1
0.4 CAPSULE ORAL
Qty: 30 | Refills: 11 | Status: ACTIVE | COMMUNITY
Start: 2025-05-06 | End: 1900-01-01

## 2025-05-07 LAB
APPEARANCE: CLEAR
BACTERIA: NEGATIVE /HPF
BILIRUBIN URINE: NEGATIVE
BLOOD URINE: NEGATIVE
CAST: 0 /LPF
COLOR: YELLOW
EPITHELIAL CELLS: 0 /HPF
GLUCOSE QUALITATIVE U: NEGATIVE MG/DL
KETONES URINE: NEGATIVE MG/DL
LEUKOCYTE ESTERASE URINE: NEGATIVE
MICROSCOPIC-UA: NORMAL
NITRITE URINE: NEGATIVE
PH URINE: 5.5
PROTEIN URINE: NEGATIVE MG/DL
PSA SERPL-MCNC: 0.47 NG/ML
RED BLOOD CELLS URINE: 0 /HPF
SPECIFIC GRAVITY URINE: 1.02
URINE CYTOLOGY: NORMAL
UROBILINOGEN URINE: 0.2 MG/DL
WHITE BLOOD CELLS URINE: 0 /HPF

## 2025-05-28 ENCOUNTER — APPOINTMENT (OUTPATIENT)
Dept: UROLOGY | Facility: CLINIC | Age: 50
End: 2025-05-28
Payer: MEDICAID

## 2025-05-28 VITALS
WEIGHT: 180 LBS | OXYGEN SATURATION: 97 % | HEART RATE: 94 BPM | BODY MASS INDEX: 28.25 KG/M2 | HEIGHT: 67 IN | SYSTOLIC BLOOD PRESSURE: 161 MMHG | TEMPERATURE: 98 F | RESPIRATION RATE: 15 BRPM | DIASTOLIC BLOOD PRESSURE: 104 MMHG

## 2025-05-28 DIAGNOSIS — N40.1 BENIGN PROSTATIC HYPERPLASIA WITH LOWER URINARY TRACT SYMPMS: ICD-10-CM

## 2025-05-28 DIAGNOSIS — N40.0 BENIGN PROSTATIC HYPERPLASIA WITHOUT LOWER URINARY TRACT SYMPMS: ICD-10-CM

## 2025-05-28 DIAGNOSIS — N13.8 BENIGN PROSTATIC HYPERPLASIA WITH LOWER URINARY TRACT SYMPMS: ICD-10-CM

## 2025-05-28 DIAGNOSIS — R10.2 PELVIC AND PERINEAL PAIN: ICD-10-CM

## 2025-05-28 PROCEDURE — 76872 US TRANSRECTAL: CPT

## 2025-05-28 PROCEDURE — A4649 SURGICAL SUPPLIES: CPT
